# Patient Record
Sex: MALE | Race: BLACK OR AFRICAN AMERICAN | Employment: FULL TIME | URBAN - METROPOLITAN AREA
[De-identification: names, ages, dates, MRNs, and addresses within clinical notes are randomized per-mention and may not be internally consistent; named-entity substitution may affect disease eponyms.]

---

## 2017-09-05 ENCOUNTER — HOSPITAL ENCOUNTER (EMERGENCY)
Facility: HOSPITAL | Age: 37
Discharge: HOME | End: 2017-09-05
Attending: EMERGENCY MEDICINE
Payer: COMMERCIAL

## 2017-09-05 VITALS
SYSTOLIC BLOOD PRESSURE: 124 MMHG | WEIGHT: 152 LBS | RESPIRATION RATE: 16 BRPM | OXYGEN SATURATION: 97 % | HEART RATE: 90 BPM | TEMPERATURE: 99 F | DIASTOLIC BLOOD PRESSURE: 89 MMHG

## 2017-09-05 DIAGNOSIS — J02.0 STREP PHARYNGITIS: Primary | ICD-10-CM

## 2017-09-05 LAB — S PYO AG THROAT QL: POSITIVE

## 2017-09-05 PROCEDURE — 87430 STREP A AG IA: CPT

## 2017-09-05 PROCEDURE — 99283 EMERGENCY DEPT VISIT LOW MDM: CPT

## 2017-09-05 RX ORDER — AMOXICILLIN 500 MG/1
1000 TABLET, FILM COATED ORAL DAILY
Qty: 14 TABLET | Refills: 0 | Status: SHIPPED | OUTPATIENT
Start: 2017-09-05 | End: 2017-09-12

## 2017-09-05 RX ORDER — ACETAMINOPHEN 500 MG
1000 TABLET ORAL ONCE
Status: COMPLETED | OUTPATIENT
Start: 2017-09-05 | End: 2017-09-05

## 2017-09-05 RX ORDER — MULTIVITAMIN WITH IRON
TABLET ORAL
COMMUNITY

## 2017-09-05 NOTE — ED INITIAL ASSESSMENT (HPI)
Triage:  Patient c/o upper abdominal pain and sore throat. Patient also with chills. Denies n/v/d, denies urinary complaints. States he drank a lot more than normal this weekend.

## 2017-09-05 NOTE — ED PROVIDER NOTES
Patient Seen in: [facility] AND [facility] Emergency Department    History   Patient presents with:  Sore Throat  Abdomen/Flank Pain (GI/)      HPI    The patient presents complaining of sore throat, fevers and chills and upper abdominal discomfort.   Symptoms %  O2 Device: None (Room air)    Physical Exam   Constitutional: He is oriented to person, place, and time. He appears well-developed and well-nourished. No distress. HENT:   Head: Normocephalic and atraumatic. Mouth/Throat: No oropharyngeal exudate. file. to contribute to the complexity of this ED evaluation. ED Course: Patient presents with pharyngitis symptoms, rapid strep screen positive. Patient no distress, no clinical concern for peritonsillar or epidural abscess.   Given Tylenol for pain con

## 2018-02-20 ENCOUNTER — APPOINTMENT (OUTPATIENT)
Dept: GENERAL RADIOLOGY | Facility: HOSPITAL | Age: 38
End: 2018-02-20
Payer: OTHER MISCELLANEOUS

## 2018-02-20 VITALS
SYSTOLIC BLOOD PRESSURE: 102 MMHG | OXYGEN SATURATION: 98 % | HEIGHT: 69 IN | TEMPERATURE: 98 F | RESPIRATION RATE: 18 BRPM | HEART RATE: 82 BPM | WEIGHT: 156 LBS | DIASTOLIC BLOOD PRESSURE: 85 MMHG | BODY MASS INDEX: 23.11 KG/M2

## 2018-02-20 PROCEDURE — 99283 EMERGENCY DEPT VISIT LOW MDM: CPT

## 2018-02-20 PROCEDURE — 73080 X-RAY EXAM OF ELBOW: CPT

## 2018-02-21 ENCOUNTER — HOSPITAL ENCOUNTER (EMERGENCY)
Facility: HOSPITAL | Age: 38
Discharge: HOME | End: 2018-02-21
Payer: OTHER MISCELLANEOUS

## 2018-02-21 DIAGNOSIS — S50.02XA CONTUSION OF LEFT ELBOW, INITIAL ENCOUNTER: Primary | ICD-10-CM

## 2018-02-21 RX ORDER — IBUPROFEN 600 MG/1
600 TABLET ORAL ONCE
Status: COMPLETED | OUTPATIENT
Start: 2018-02-21 | End: 2018-02-21

## 2018-02-21 NOTE — ED PROVIDER NOTES
Patient Seen in: [facility] AND [facility] Emergency Department    History   Patient presents with:  Arm Pain      HPI    Patient presents to the ED after falling at work and hurting his left arm.   He states he fell off of a machine and landed on the left arm du diffuse, no bony deformity, reduced range of motion secondary to pain. Neurovascular intact in the distal arm. Neurological: He is alert. Coordination normal.   Skin: Skin is warm and dry. Psychiatric: He has a normal mood and affect.  His behavior is encounter diagnosis)    Disposition:  Discharge    Follow-up:  [address]  [address] [place] [id number]  [phone]  Schedule an appointment as soon as possible for a visit in 1 day        Medications Prescribed:

## 2019-08-16 ENCOUNTER — APPOINTMENT (OUTPATIENT)
Dept: CT IMAGING | Facility: HOSPITAL | Age: 39
End: 2019-08-16
Attending: NURSE PRACTITIONER
Payer: COMMERCIAL

## 2019-08-16 ENCOUNTER — HOSPITAL ENCOUNTER (EMERGENCY)
Facility: HOSPITAL | Age: 39
Discharge: HOME | End: 2019-08-16
Payer: COMMERCIAL

## 2019-08-16 VITALS
BODY MASS INDEX: 22.51 KG/M2 | HEART RATE: 67 BPM | HEIGHT: 69 IN | TEMPERATURE: 99 F | OXYGEN SATURATION: 100 % | WEIGHT: 152 LBS | DIASTOLIC BLOOD PRESSURE: 79 MMHG | SYSTOLIC BLOOD PRESSURE: 122 MMHG | RESPIRATION RATE: 16 BRPM

## 2019-08-16 DIAGNOSIS — R51.9 ACUTE NONINTRACTABLE HEADACHE, UNSPECIFIED HEADACHE TYPE: Primary | ICD-10-CM

## 2019-08-16 LAB
ANION GAP SERPL CALC-SCNC: 6 MMOL/L (ref 0–18)
BASOPHILS # BLD AUTO: 0.03 X10(3) UL (ref 0–0.2)
BASOPHILS NFR BLD AUTO: 0.4 %
BUN BLD-MCNC: 12 MG/DL (ref 7–18)
BUN/CREAT SERPL: 13.6 (ref 10–20)
CALCIUM BLD-MCNC: 9.2 MG/DL (ref 8.5–10.1)
CHLORIDE SERPL-SCNC: 107 MMOL/L (ref 98–112)
CO2 SERPL-SCNC: 30 MMOL/L (ref 21–32)
CREAT BLD-MCNC: 0.88 MG/DL (ref 0.7–1.3)
DEPRECATED RDW RBC AUTO: 48 FL (ref 35.1–46.3)
EOSINOPHIL # BLD AUTO: 0.03 X10(3) UL (ref 0–0.7)
EOSINOPHIL NFR BLD AUTO: 0.4 %
ERYTHROCYTE [DISTWIDTH] IN BLOOD BY AUTOMATED COUNT: 15.5 % (ref 11–15)
GLUCOSE BLD-MCNC: 120 MG/DL (ref 70–99)
HCT VFR BLD AUTO: 40.1 % (ref 39–53)
HGB BLD-MCNC: 13.2 G/DL (ref 13–17.5)
IMM GRANULOCYTES # BLD AUTO: 0.01 X10(3) UL (ref 0–1)
IMM GRANULOCYTES NFR BLD: 0.1 %
LYMPHOCYTES # BLD AUTO: 1.8 X10(3) UL (ref 1–4)
LYMPHOCYTES NFR BLD AUTO: 26.8 %
MCH RBC QN AUTO: 28 PG (ref 26–34)
MCHC RBC AUTO-ENTMCNC: 32.9 G/DL (ref 31–37)
MCV RBC AUTO: 85.1 FL (ref 80–100)
MONOCYTES # BLD AUTO: 0.47 X10(3) UL (ref 0.1–1)
MONOCYTES NFR BLD AUTO: 7 %
NEUTROPHILS # BLD AUTO: 4.38 X10 (3) UL (ref 1.5–7.7)
NEUTROPHILS # BLD AUTO: 4.38 X10(3) UL (ref 1.5–7.7)
NEUTROPHILS NFR BLD AUTO: 65.3 %
OSMOLALITY SERPL CALC.SUM OF ELEC: 297 MOSM/KG (ref 275–295)
PLATELET # BLD AUTO: 271 10(3)UL (ref 150–450)
POTASSIUM SERPL-SCNC: 3.4 MMOL/L (ref 3.5–5.1)
RBC # BLD AUTO: 4.71 X10(6)UL (ref 4.3–5.7)
SODIUM SERPL-SCNC: 143 MMOL/L (ref 136–145)
WBC # BLD AUTO: 6.7 X10(3) UL (ref 4–11)

## 2019-08-16 PROCEDURE — 85025 COMPLETE CBC W/AUTO DIFF WBC: CPT | Performed by: NURSE PRACTITIONER

## 2019-08-16 PROCEDURE — 99284 EMERGENCY DEPT VISIT MOD MDM: CPT

## 2019-08-16 PROCEDURE — 96361 HYDRATE IV INFUSION ADD-ON: CPT

## 2019-08-16 PROCEDURE — 80048 BASIC METABOLIC PNL TOTAL CA: CPT | Performed by: NURSE PRACTITIONER

## 2019-08-16 PROCEDURE — 96375 TX/PRO/DX INJ NEW DRUG ADDON: CPT

## 2019-08-16 PROCEDURE — 70450 CT HEAD/BRAIN W/O DYE: CPT | Performed by: NURSE PRACTITIONER

## 2019-08-16 PROCEDURE — 96374 THER/PROPH/DIAG INJ IV PUSH: CPT

## 2019-08-16 RX ORDER — KETOROLAC TROMETHAMINE 30 MG/ML
30 INJECTION, SOLUTION INTRAMUSCULAR; INTRAVENOUS ONCE
Status: COMPLETED | OUTPATIENT
Start: 2019-08-16 | End: 2019-08-16

## 2019-08-16 RX ORDER — DIPHENHYDRAMINE HYDROCHLORIDE 50 MG/ML
25 INJECTION INTRAMUSCULAR; INTRAVENOUS ONCE
Status: COMPLETED | OUTPATIENT
Start: 2019-08-16 | End: 2019-08-16

## 2019-08-16 RX ORDER — METOCLOPRAMIDE HYDROCHLORIDE 5 MG/ML
10 INJECTION INTRAMUSCULAR; INTRAVENOUS ONCE
Status: COMPLETED | OUTPATIENT
Start: 2019-08-16 | End: 2019-08-16

## 2019-08-16 NOTE — ED PROVIDER NOTES
Patient Seen in: [facility] AND [facility] Emergency Department    History   Patient presents with:  Headache (neurologic)    Stated Complaint: headache    HPI    16-year-old male presents to the emergency department complaining of intermittent headaches chronic Reflexes in all extremities are 2+ and normal.  Lungs: Clear to auscultation bilaterally, no wheezes, rales or rhonchi. CV: Regular rate and rhythm without murmurs, rubs or gallops. Abdomen: Soft and nontender. Normal bowel sounds.   Extremities: Pulses s their doctor as instructed.  The patient verbalized understanding of the discharge instructions and plan                  Disposition and Plan     Clinical Impression:  Acute nonintractable headache, unspecified headache type  (primary encounter diagnosis)

## 2019-12-10 ENCOUNTER — TELEPHONE (OUTPATIENT)
Dept: CASE MANAGEMENT | Facility: HOSPITAL | Age: 39
End: 2019-12-10

## 2019-12-12 PROBLEM — G43.119 INTRACTABLE MIGRAINE WITH AURA WITHOUT STATUS MIGRAINOSUS: Status: ACTIVE | Noted: 2019-12-12

## 2023-05-05 ENCOUNTER — HOSPITAL ENCOUNTER (EMERGENCY)
Age: 43
Discharge: LEFT BEFORE DISCHARGE | End: 2023-05-06

## 2023-05-05 VITALS
HEART RATE: 94 BPM | DIASTOLIC BLOOD PRESSURE: 73 MMHG | RESPIRATION RATE: 16 BRPM | OXYGEN SATURATION: 97 % | SYSTOLIC BLOOD PRESSURE: 113 MMHG | TEMPERATURE: 97.6 F

## (undated) NOTE — LETTER
September 5, 2017    Patient: Orquidea Carroll   Date of Visit: 9/5/2017       To Whom It May Concern:    Orquidea Carroll was seen and treated in our emergency department on 9/5/2017. He should not return to work until 09/07/17.     If you have any questions

## (undated) NOTE — ED AVS SNAPSHOT
Ravinder Parent   MRN: [id number]    Department:  [facility] Emergency Department   Date of Visit:  8/16/2019           Disclosure     Insurance plans vary and the physician(s) referred by the ER may not be covered by your plan.  Please contact yo CARE PHYSICIAN AT ONCE OR RETURN IMMEDIATELY TO THE EMERGENCY DEPARTMENT. If you have been prescribed any medication(s), please fill your prescription right away and begin taking the medication(s) as directed.   If you believe that any of the medications

## (undated) NOTE — ED AVS SNAPSHOT
Rylee Johnson   MRN: [id number]    Department:  [facility] Emergency Department   Date of Visit:  9/5/2017           Disclosure     Insurance plans vary and the physician(s) referred by the ER may not be covered by your plan.  Please contact you CARE PHYSICIAN AT ONCE OR RETURN IMMEDIATELY TO THE EMERGENCY DEPARTMENT. If you have been prescribed any medication(s), please fill your prescription right away and begin taking the medication(s) as directed.   If you believe that any of the medications

## (undated) NOTE — ED AVS SNAPSHOT
Viet Wabasso   MRN: [id number]    Department:  [facility] Emergency Department   Date of Visit:  2018           Disclosure     Insurance plans vary and the physician(s) referred by the ER may not be covered by your plan.  Please contact yo CARE PHYSICIAN AT ONCE OR RETURN IMMEDIATELY TO THE EMERGENCY DEPARTMENT. If you have been prescribed any medication(s), please fill your prescription right away and begin taking the medication(s) as directed.   If you believe that any of the medications

## (undated) NOTE — LETTER
Date & Time: 8/16/2019, 4:17 PM  Patient: Amy Munoz  Encounter Provider(s):    HARVEY Easton       To Whom It May Concern:    Amy Munoz was seen and treated in our department on 8/16/2019.  He should not return to work until 8/19/19